# Patient Record
Sex: MALE | Race: WHITE | URBAN - METROPOLITAN AREA
[De-identification: names, ages, dates, MRNs, and addresses within clinical notes are randomized per-mention and may not be internally consistent; named-entity substitution may affect disease eponyms.]

---

## 2017-11-06 PROBLEM — I25.10 CORONARY ARTERY DISEASE INVOLVING NATIVE HEART WITHOUT ANGINA PECTORIS, UNSPECIFIED VESSEL OR LESION TYPE: Status: ACTIVE | Noted: 2017-11-06

## 2019-06-07 PROBLEM — E78.89 LIPIDS ABNORMAL: Status: ACTIVE | Noted: 2019-06-07

## 2019-06-07 PROBLEM — I10 ESSENTIAL HYPERTENSION: Status: ACTIVE | Noted: 2019-06-07

## 2023-08-09 RX ORDER — CARVEDILOL 12.5 MG/1
12.5 TABLET ORAL 2 TIMES DAILY WITH MEALS
COMMUNITY

## 2023-08-09 RX ORDER — AMLODIPINE BESYLATE 10 MG/1
10 TABLET ORAL DAILY
COMMUNITY

## 2023-08-11 RX ORDER — DULAGLUTIDE 0.75 MG/.5ML
INJECTION, SOLUTION SUBCUTANEOUS WEEKLY
COMMUNITY

## 2023-08-11 RX ORDER — PRAVASTATIN SODIUM 40 MG
40 TABLET ORAL NIGHTLY
COMMUNITY

## 2023-08-11 NOTE — PAT NURSING NOTE
TOA 2:00 pm, register at [facility] registration desk Andrea huang of [facility], park in [address] [place] [address]. Will call day before to confirm TOA. NPO at midnight, sips of water in am with aspirin, brilinta, pravastatin, carvedilol and amlodipine. No gum candy food or drink after midnight. Last dose of all over the counter vitamins,supplements and nsaids on 8/9, no insulin or diabetic medications am of surgery. L/D metformin, farxiga and lisinopril 8/15 am, remove continuous glucose monitor prior to surgery. Hold trulicity am of surgery. Remove jewelry etc. Shower with antibacterial soap such as dial. PATs on admit ok per [facility] office. Continue aspirin, plavix and statin. All questions answered and patient instructed to call PAT with any further questions.     GUNNER Snell RN

## 2023-08-16 ENCOUNTER — ANESTHESIA (OUTPATIENT)
Dept: CARDIAC SURGERY | Facility: HOSPITAL | Age: 68
End: 2023-08-16
Payer: MEDICARE

## 2023-08-16 ENCOUNTER — HOSPITAL ENCOUNTER (INPATIENT)
Facility: HOSPITAL | Age: 68
LOS: 2 days | Discharge: HOME | End: 2023-08-18
Attending: SURGERY | Admitting: SURGERY
Payer: MEDICARE

## 2023-08-16 ENCOUNTER — ANESTHESIA EVENT (OUTPATIENT)
Dept: CARDIAC SURGERY | Facility: HOSPITAL | Age: 68
End: 2023-08-16
Payer: MEDICARE

## 2023-08-16 DIAGNOSIS — Z91.89 AT RISK FOR BLEEDING: ICD-10-CM

## 2023-08-16 DIAGNOSIS — I65.22 CAROTID STENOSIS, LEFT: ICD-10-CM

## 2023-08-16 DIAGNOSIS — Z01.818 PRE-OP TESTING: Primary | ICD-10-CM

## 2023-08-16 LAB
ALBUMIN SERPL-MCNC: 3.9 G/DL (ref 3.4–5)
ALBUMIN/GLOB SERPL: 1 {RATIO} (ref 1–2)
ALP LIVER SERPL-CCNC: 75 U/L
ALT SERPL-CCNC: 35 U/L
ANION GAP SERPL CALC-SCNC: 7 MMOL/L (ref 0–18)
ANTIBODY SCREEN: NEGATIVE
APTT PPP: 29 SECONDS (ref 23.3–35.6)
AST SERPL-CCNC: 33 U/L (ref 15–37)
ATRIAL RATE: 61 BPM
BASOPHILS # BLD AUTO: 0.05 X10(3) UL (ref 0–0.2)
BASOPHILS NFR BLD AUTO: 0.6 %
BILIRUB SERPL-MCNC: 1 MG/DL (ref 0.1–2)
BUN BLD-MCNC: 28 MG/DL (ref 7–18)
CALCIUM BLD-MCNC: 9.1 MG/DL (ref 8.5–10.1)
CHLORIDE SERPL-SCNC: 107 MMOL/L (ref 98–112)
CO2 SERPL-SCNC: 24 MMOL/L (ref 21–32)
CREAT BLD-MCNC: 1.46 MG/DL
EGFRCR SERPLBLD CKD-EPI 2021: 52 ML/MIN/1.73M2 (ref 60–?)
EOSINOPHIL # BLD AUTO: 0.17 X10(3) UL (ref 0–0.7)
EOSINOPHIL NFR BLD AUTO: 2.1 %
ERYTHROCYTE [DISTWIDTH] IN BLOOD BY AUTOMATED COUNT: 12.3 %
GLOBULIN PLAS-MCNC: 3.8 G/DL (ref 2.8–4.4)
GLUCOSE BLD-MCNC: 111 MG/DL (ref 70–99)
GLUCOSE BLD-MCNC: 152 MG/DL (ref 70–99)
GLUCOSE BLD-MCNC: 154 MG/DL (ref 70–99)
GLUCOSE BLD-MCNC: 161 MG/DL (ref 70–99)
HCT VFR BLD AUTO: 42.5 %
HGB BLD-MCNC: 14.8 G/DL
IMM GRANULOCYTES # BLD AUTO: 0.04 X10(3) UL (ref 0–1)
IMM GRANULOCYTES NFR BLD: 0.5 %
INR BLD: 1 (ref 0.85–1.16)
LYMPHOCYTES # BLD AUTO: 2.48 X10(3) UL (ref 1–4)
LYMPHOCYTES NFR BLD AUTO: 30.3 %
MCH RBC QN AUTO: 30.4 PG (ref 26–34)
MCHC RBC AUTO-ENTMCNC: 34.8 G/DL (ref 31–37)
MCV RBC AUTO: 87.3 FL
MONOCYTES # BLD AUTO: 0.63 X10(3) UL (ref 0.1–1)
MONOCYTES NFR BLD AUTO: 7.7 %
MRSA DNA SPEC QL NAA+PROBE: NEGATIVE
NEUTROPHILS # BLD AUTO: 4.81 X10 (3) UL (ref 1.5–7.7)
NEUTROPHILS # BLD AUTO: 4.81 X10(3) UL (ref 1.5–7.7)
NEUTROPHILS NFR BLD AUTO: 58.8 %
OSMOLALITY SERPL CALC.SUM OF ELEC: 295 MOSM/KG (ref 275–295)
P AXIS: 10 DEGREES
P-R INTERVAL: 186 MS
PLATELET # BLD AUTO: 252 10(3)UL (ref 150–450)
POTASSIUM SERPL-SCNC: 4.5 MMOL/L (ref 3.5–5.1)
PROT SERPL-MCNC: 7.7 G/DL (ref 6.4–8.2)
PROTHROMBIN TIME: 13.2 SECONDS (ref 11.6–14.8)
Q-T INTERVAL: 398 MS
QRS DURATION: 130 MS
QTC CALCULATION (BEZET): 400 MS
R AXIS: -8 DEGREES
RBC # BLD AUTO: 4.87 X10(6)UL
RH BLOOD TYPE: POSITIVE
SARS-COV-2 RNA RESP QL NAA+PROBE: NOT DETECTED
SODIUM SERPL-SCNC: 138 MMOL/L (ref 136–145)
T AXIS: 2 DEGREES
VENTRICULAR RATE: 61 BPM
WBC # BLD AUTO: 8.2 X10(3) UL (ref 4–11)

## 2023-08-16 PROCEDURE — 85610 PROTHROMBIN TIME: CPT | Performed by: SURGERY

## 2023-08-16 PROCEDURE — 85730 THROMBOPLASTIN TIME PARTIAL: CPT | Performed by: SURGERY

## 2023-08-16 PROCEDURE — 85347 COAGULATION TIME ACTIVATED: CPT

## 2023-08-16 PROCEDURE — 80053 COMPREHEN METABOLIC PANEL: CPT | Performed by: SURGERY

## 2023-08-16 PROCEDURE — 86850 RBC ANTIBODY SCREEN: CPT | Performed by: SURGERY

## 2023-08-16 PROCEDURE — 86901 BLOOD TYPING SEROLOGIC RH(D): CPT | Performed by: SURGERY

## 2023-08-16 PROCEDURE — 85025 COMPLETE CBC W/AUTO DIFF WBC: CPT | Performed by: SURGERY

## 2023-08-16 PROCEDURE — 93005 ELECTROCARDIOGRAM TRACING: CPT

## 2023-08-16 PROCEDURE — 037L3DZ DILATION OF LEFT INTERNAL CAROTID ARTERY WITH INTRALUMINAL DEVICE, PERCUTANEOUS APPROACH: ICD-10-PCS | Performed by: SURGERY

## 2023-08-16 PROCEDURE — X2AJ336 CEREBRAL EMBOLIC FILTRATION, EXTRACORPOREAL FLOW REVERSAL CIRCUIT FROM LEFT COMMON CAROTID ARTERY, PERCUTANEOUS APPROACH, NEW TECHNOLOGY GROUP 6: ICD-10-PCS | Performed by: SURGERY

## 2023-08-16 PROCEDURE — 86900 BLOOD TYPING SEROLOGIC ABO: CPT | Performed by: SURGERY

## 2023-08-16 PROCEDURE — 76942 ECHO GUIDE FOR BIOPSY: CPT | Performed by: INTERNAL MEDICINE

## 2023-08-16 PROCEDURE — 86920 COMPATIBILITY TEST SPIN: CPT

## 2023-08-16 PROCEDURE — 82962 GLUCOSE BLOOD TEST: CPT

## 2023-08-16 PROCEDURE — 93010 ELECTROCARDIOGRAM REPORT: CPT | Performed by: INTERNAL MEDICINE

## 2023-08-16 PROCEDURE — 87641 MR-STAPH DNA AMP PROBE: CPT | Performed by: SURGERY

## 2023-08-16 DEVICE — 9 MM X 40 MM
Type: IMPLANTABLE DEVICE | Status: FUNCTIONAL
Brand: ENROUTE TRANSCAROTID STENT

## 2023-08-16 RX ORDER — SODIUM CHLORIDE, SODIUM LACTATE, POTASSIUM CHLORIDE, CALCIUM CHLORIDE 600; 310; 30; 20 MG/100ML; MG/100ML; MG/100ML; MG/100ML
INJECTION, SOLUTION INTRAVENOUS CONTINUOUS PRN
Status: DISCONTINUED | OUTPATIENT
Start: 2023-08-16 | End: 2023-08-16 | Stop reason: SURG

## 2023-08-16 RX ORDER — HYDROMORPHONE HYDROCHLORIDE 1 MG/ML
0.6 INJECTION, SOLUTION INTRAMUSCULAR; INTRAVENOUS; SUBCUTANEOUS EVERY 5 MIN PRN
Status: ACTIVE | OUTPATIENT
Start: 2023-08-16 | End: 2023-08-17

## 2023-08-16 RX ORDER — ACETAMINOPHEN 325 MG/1
650 TABLET ORAL EVERY 4 HOURS PRN
Status: DISCONTINUED | OUTPATIENT
Start: 2023-08-16 | End: 2023-08-18

## 2023-08-16 RX ORDER — HYDROMORPHONE HYDROCHLORIDE 1 MG/ML
0.2 INJECTION, SOLUTION INTRAMUSCULAR; INTRAVENOUS; SUBCUTANEOUS EVERY 2 HOUR PRN
Status: DISCONTINUED | OUTPATIENT
Start: 2023-08-16 | End: 2023-08-18

## 2023-08-16 RX ORDER — HEPARIN SODIUM 5000 [USP'U]/ML
5000 INJECTION, SOLUTION INTRAVENOUS; SUBCUTANEOUS ONCE
Status: DISCONTINUED | OUTPATIENT
Start: 2023-08-16 | End: 2023-08-16

## 2023-08-16 RX ORDER — DEXTROSE MONOHYDRATE 25 G/50ML
50 INJECTION, SOLUTION INTRAVENOUS
Status: DISCONTINUED | OUTPATIENT
Start: 2023-08-16 | End: 2023-08-18

## 2023-08-16 RX ORDER — HEPARIN SODIUM 5000 [USP'U]/ML
5000 INJECTION, SOLUTION INTRAVENOUS; SUBCUTANEOUS EVERY 8 HOURS SCHEDULED
Status: DISCONTINUED | OUTPATIENT
Start: 2023-08-17 | End: 2023-08-18

## 2023-08-16 RX ORDER — METOCLOPRAMIDE HYDROCHLORIDE 5 MG/ML
10 INJECTION INTRAMUSCULAR; INTRAVENOUS EVERY 8 HOURS PRN
Status: DISCONTINUED | OUTPATIENT
Start: 2023-08-16 | End: 2023-08-16

## 2023-08-16 RX ORDER — HYDROCODONE BITARTRATE AND ACETAMINOPHEN 5; 325 MG/1; MG/1
1 TABLET ORAL EVERY 4 HOURS PRN
Status: DISCONTINUED | OUTPATIENT
Start: 2023-08-16 | End: 2023-08-18

## 2023-08-16 RX ORDER — MIDAZOLAM HYDROCHLORIDE 1 MG/ML
INJECTION INTRAMUSCULAR; INTRAVENOUS AS NEEDED
Status: DISCONTINUED | OUTPATIENT
Start: 2023-08-16 | End: 2023-08-16 | Stop reason: SURG

## 2023-08-16 RX ORDER — LIDOCAINE HYDROCHLORIDE 10 MG/ML
INJECTION, SOLUTION INFILTRATION; PERINEURAL AS NEEDED
Status: DISCONTINUED | OUTPATIENT
Start: 2023-08-16 | End: 2023-08-16 | Stop reason: HOSPADM

## 2023-08-16 RX ORDER — LISINOPRIL 40 MG/1
40 TABLET ORAL DAILY
Status: DISCONTINUED | OUTPATIENT
Start: 2023-08-16 | End: 2023-08-18

## 2023-08-16 RX ORDER — NICOTINE POLACRILEX 4 MG
30 LOZENGE BUCCAL
Status: DISCONTINUED | OUTPATIENT
Start: 2023-08-16 | End: 2023-08-18

## 2023-08-16 RX ORDER — SODIUM CHLORIDE, SODIUM LACTATE, POTASSIUM CHLORIDE, CALCIUM CHLORIDE 600; 310; 30; 20 MG/100ML; MG/100ML; MG/100ML; MG/100ML
INJECTION, SOLUTION INTRAVENOUS CONTINUOUS
Status: DISCONTINUED | OUTPATIENT
Start: 2023-08-16 | End: 2023-08-18

## 2023-08-16 RX ORDER — GLYCOPYRROLATE 0.2 MG/ML
INJECTION, SOLUTION INTRAMUSCULAR; INTRAVENOUS AS NEEDED
Status: DISCONTINUED | OUTPATIENT
Start: 2023-08-16 | End: 2023-08-16 | Stop reason: SURG

## 2023-08-16 RX ORDER — HYDROMORPHONE HYDROCHLORIDE 1 MG/ML
0.2 INJECTION, SOLUTION INTRAMUSCULAR; INTRAVENOUS; SUBCUTANEOUS EVERY 5 MIN PRN
Status: ACTIVE | OUTPATIENT
Start: 2023-08-16 | End: 2023-08-17

## 2023-08-16 RX ORDER — IODIXANOL 320 MG/ML
100 INJECTION, SOLUTION INTRAVASCULAR
Status: DISCONTINUED | OUTPATIENT
Start: 2023-08-16 | End: 2023-08-18

## 2023-08-16 RX ORDER — HYDROCODONE BITARTRATE AND ACETAMINOPHEN 5; 325 MG/1; MG/1
2 TABLET ORAL EVERY 4 HOURS PRN
Status: DISCONTINUED | OUTPATIENT
Start: 2023-08-16 | End: 2023-08-18

## 2023-08-16 RX ORDER — ASPIRIN 81 MG/1
81 TABLET, CHEWABLE ORAL DAILY
Status: DISCONTINUED | OUTPATIENT
Start: 2023-08-16 | End: 2023-08-18

## 2023-08-16 RX ORDER — LOPERAMIDE HYDROCHLORIDE 2 MG/1
2 CAPSULE ORAL 4 TIMES DAILY PRN
Status: DISCONTINUED | OUTPATIENT
Start: 2023-08-16 | End: 2023-08-18

## 2023-08-16 RX ORDER — HYDROMORPHONE HYDROCHLORIDE 1 MG/ML
0.4 INJECTION, SOLUTION INTRAMUSCULAR; INTRAVENOUS; SUBCUTANEOUS EVERY 5 MIN PRN
Status: ACTIVE | OUTPATIENT
Start: 2023-08-16 | End: 2023-08-17

## 2023-08-16 RX ORDER — ONDANSETRON 2 MG/ML
4 INJECTION INTRAMUSCULAR; INTRAVENOUS EVERY 6 HOURS PRN
Status: DISCONTINUED | OUTPATIENT
Start: 2023-08-16 | End: 2023-08-18

## 2023-08-16 RX ORDER — PRAVASTATIN SODIUM 20 MG
40 TABLET ORAL NIGHTLY
Status: DISCONTINUED | OUTPATIENT
Start: 2023-08-16 | End: 2023-08-18

## 2023-08-16 RX ORDER — ONDANSETRON 2 MG/ML
4 INJECTION INTRAMUSCULAR; INTRAVENOUS EVERY 6 HOURS PRN
Status: DISCONTINUED | OUTPATIENT
Start: 2023-08-16 | End: 2023-08-16

## 2023-08-16 RX ORDER — HYDROMORPHONE HYDROCHLORIDE 1 MG/ML
0.4 INJECTION, SOLUTION INTRAMUSCULAR; INTRAVENOUS; SUBCUTANEOUS EVERY 2 HOUR PRN
Status: DISCONTINUED | OUTPATIENT
Start: 2023-08-16 | End: 2023-08-18

## 2023-08-16 RX ORDER — HEPARIN SODIUM 1000 [USP'U]/ML
INJECTION, SOLUTION INTRAVENOUS; SUBCUTANEOUS AS NEEDED
Status: DISCONTINUED | OUTPATIENT
Start: 2023-08-16 | End: 2023-08-16 | Stop reason: SURG

## 2023-08-16 RX ORDER — CEFAZOLIN SODIUM/WATER 2 G/20 ML
SYRINGE (ML) INTRAVENOUS AS NEEDED
Status: DISCONTINUED | OUTPATIENT
Start: 2023-08-16 | End: 2023-08-16 | Stop reason: SURG

## 2023-08-16 RX ORDER — METOCLOPRAMIDE HYDROCHLORIDE 5 MG/ML
10 INJECTION INTRAMUSCULAR; INTRAVENOUS EVERY 8 HOURS PRN
Status: DISCONTINUED | OUTPATIENT
Start: 2023-08-16 | End: 2023-08-18

## 2023-08-16 RX ORDER — AMLODIPINE BESYLATE 5 MG/1
10 TABLET ORAL DAILY
Status: DISCONTINUED | OUTPATIENT
Start: 2023-08-16 | End: 2023-08-18

## 2023-08-16 RX ORDER — CEFAZOLIN SODIUM/WATER 2 G/20 ML
2 SYRINGE (ML) INTRAVENOUS EVERY 8 HOURS
Status: COMPLETED | OUTPATIENT
Start: 2023-08-17 | End: 2023-08-17

## 2023-08-16 RX ORDER — NALOXONE HYDROCHLORIDE 0.4 MG/ML
80 INJECTION, SOLUTION INTRAMUSCULAR; INTRAVENOUS; SUBCUTANEOUS AS NEEDED
Status: ACTIVE | OUTPATIENT
Start: 2023-08-16 | End: 2023-08-17

## 2023-08-16 RX ORDER — NICOTINE POLACRILEX 4 MG
15 LOZENGE BUCCAL
Status: DISCONTINUED | OUTPATIENT
Start: 2023-08-16 | End: 2023-08-18

## 2023-08-16 RX ORDER — BUPIVACAINE HYDROCHLORIDE 5 MG/ML
INJECTION, SOLUTION EPIDURAL; INTRACAUDAL AS NEEDED
Status: DISCONTINUED | OUTPATIENT
Start: 2023-08-16 | End: 2023-08-16 | Stop reason: HOSPADM

## 2023-08-16 RX ORDER — CARVEDILOL 12.5 MG/1
12.5 TABLET ORAL 2 TIMES DAILY WITH MEALS
Status: DISCONTINUED | OUTPATIENT
Start: 2023-08-16 | End: 2023-08-18

## 2023-08-16 RX ORDER — HYDROMORPHONE HYDROCHLORIDE 1 MG/ML
0.8 INJECTION, SOLUTION INTRAMUSCULAR; INTRAVENOUS; SUBCUTANEOUS EVERY 2 HOUR PRN
Status: DISCONTINUED | OUTPATIENT
Start: 2023-08-16 | End: 2023-08-18

## 2023-08-16 RX ORDER — DEXMEDETOMIDINE HYDROCHLORIDE 4 UG/ML
INJECTION, SOLUTION INTRAVENOUS CONTINUOUS PRN
Status: DISCONTINUED | OUTPATIENT
Start: 2023-08-16 | End: 2023-08-16 | Stop reason: SURG

## 2023-08-16 RX ORDER — GABAPENTIN 300 MG/1
900 CAPSULE ORAL 3 TIMES DAILY
Status: DISCONTINUED | OUTPATIENT
Start: 2023-08-16 | End: 2023-08-18

## 2023-08-16 RX ORDER — PROTAMINE SULFATE 10 MG/ML
INJECTION, SOLUTION INTRAVENOUS AS NEEDED
Status: DISCONTINUED | OUTPATIENT
Start: 2023-08-16 | End: 2023-08-16 | Stop reason: SURG

## 2023-08-16 RX ADMIN — GLYCOPYRROLATE 0.2 MG: 0.2 INJECTION, SOLUTION INTRAMUSCULAR; INTRAVENOUS at 17:22:00

## 2023-08-16 RX ADMIN — CEFAZOLIN SODIUM/WATER 2 G: 2 G/20 ML SYRINGE (ML) INTRAVENOUS at 17:01:00

## 2023-08-16 RX ADMIN — DEXMEDETOMIDINE HYDROCHLORIDE 0.8 MCG/KG/HR: 4 INJECTION, SOLUTION INTRAVENOUS at 16:42:00

## 2023-08-16 RX ADMIN — MIDAZOLAM HYDROCHLORIDE 4 MG: 1 INJECTION INTRAMUSCULAR; INTRAVENOUS at 17:02:00

## 2023-08-16 RX ADMIN — HEPARIN SODIUM 13000 UNITS: 1000 INJECTION, SOLUTION INTRAVENOUS; SUBCUTANEOUS at 17:17:00

## 2023-08-16 RX ADMIN — SODIUM CHLORIDE, SODIUM LACTATE, POTASSIUM CHLORIDE, CALCIUM CHLORIDE: 600; 310; 30; 20 INJECTION, SOLUTION INTRAVENOUS at 16:39:00

## 2023-08-16 RX ADMIN — GLYCOPYRROLATE 0.2 MG: 0.2 INJECTION, SOLUTION INTRAMUSCULAR; INTRAVENOUS at 17:13:00

## 2023-08-16 RX ADMIN — PROTAMINE SULFATE 40 MG: 10 INJECTION, SOLUTION INTRAVENOUS at 17:41:00

## 2023-08-16 NOTE — OPERATIVE REPORT
Vascular Surgery Operative Report  Kate Ovalles  [id number]  3/3/1955          Date of Procedure: 08/16/2023        PREOPERATIVE DIAGNOSIS:  1. Asymptomatic left ICA stenosis         POSTOPERATIVE DIAGNOSIS:  1. Asymptomatic Left ICA stenosis         OPERATION PERFORMED:     1. Ultrasound guided percutaneous access of left common femoral vein       2. Angioplasty/stenting of the left ICA [using TransCarotid Artery Revascularization (TCAR) technique] with [address] 9 x 40 mm self expanding stent           ANESTHESIA:  MAC, Local        SURGEONS:   Cordell Valentine MD     ASSISTANT: Christian Moore SA        ESTIMATED BLOOD LOSS:  20 mL. DRAINS:  None. TRANSFUSIONS:  None. COMPLICATIONS:  None apparent. FLUOROSCOPY TIME: 4.6 min     FLOW REVERSAL TIME: 5 minutes     AORTIC ARCH TYPE: 1A     Procedure time: 45 min. INDICATIONS FOR PROCEDURE:  This is a 80year old male who presented with high grade carotid stenosis on the left. The patient underwent a CT angiogram of the head & neck that revealed a high grade stenoses with a high lesion at level of C1/C2. The patient was on aspirin and Plavix and referred for surgery. Cardiac workup was negative. Given on evaluation he was found to have an anatomically difficult lesion to repair with conventional carotid endarterectomy and previous neck surgery with her age and symptomatic nature, he was thus recommended for carotid artery angioplasty/stenting (MARIA M) was recommended to the patient with flow reversal for embolic protection (TCAR technique). The risks and benefits of the procedure were  discussed at length with the patient, not limited to the risk of bleeding, infection, stroke, myocardial infarction, and/or death. Informed consent was directly obtained from the patient. FINDINGS:  1. The left common femoral venous access flushed and aspirated well without issues.   2. The left ICA with 90 % stenosis and located at C1-C2 vertebral level.  3. Successful left sided TCAR. Post intervention, there was no residual stenosis. 3. Patient was neurologically intact at end of procedure. DESCRIPTION OF PROCEDURE: The patient was  brought to the operating room and placed in supine position. Arterial line was placed in sterile  fashion without any complication. Anesthesia was initiated without any issues. Ancef was administered for surgical  antimicrobial prophylaxis. The patient was subsequently prepped and draped in  the usual sterile fashion. Timeout was performed. A 4 cm transverse incision  was made over the inferior neck along the space between the sternal and  clavicular heads of the sternocleidomastoid muscle. Dissection was carried  down with electrocautery. The common carotid artery was identified and circumferentially dissected. This was then encircled with an  umbilical tape. Approximately 3 cm of common carotid was dissected proximally  and distally. 5-0 Prolene stay sutures were placed at the base of the common carotid to serve as our repair stitches  at the end of the procedure. In the groin, with the use of ultrasound guidance, the common femoral vein  was accessed with a micro needle with advancement of a microwire and exchanged for a micro sheath. J wire was put in place. This was  predilated with a 5Fr sheath and then exchanged for the 8-Romanian venous sheath that was  advanced over the wire and drawn back, confirming successful cannulation, and  flushed with heparinized saline. The patient was systemically heparinized with activated clotting time of above 300 seconds. The systolic blood pressure was iatrogenically elevated to 160-180s mmHg. A micropuncture needle was used to  access the common carotid artery, and a 0.014 wire was advanced over this.   We  then replaced this with the 4-Romanian dilating sheath, and the wire was  withdrawn, and a head and neck arteriogram was performed to confirm the  location of our lesion. We then exchanged for the [employer] .035 wire and then exchanged for a the [employer] medical?8Fr Sheath. Once the sheath was in place, this was flushed  with heparinized saline and sutured to the skin with a silk suture. Once this was performed,  the sheaths were connected by the flow reversal system and flushed thereby initiating passive flow reversal.  After confirming adequate ACTs and BP levels, we were ready to proceed. A repeat head and neck angiogram was performed to determine our landing zone. A rommell tourniquet was cinched down thereby initiating full flow reversal.     We advanced a silk road 0.014\" wire which were used to traverse the lesion with wire placement into the ICA. Predilation was performed with 5 mm balloon. We then exchanged for a 9 x  40 mm [employer] self-expanding bare metal stent  which was brought up to the culprit lesion and deployed without any  difficulty. A repeat head and neck angiogram revealed wide patency. The system was flushed for an additional minute and then the rommell was removed thereby restablishing antegrade flow. The sheaths were disconnected thereby terminating the flow reversal and the blood was allowed to return into the venous  cannula. At this point, we were satisfied with  our repair and opted to proceed with closure. The sheath was subsequently  removed with cinching down of the pre-close sutures, which were eventually  cinched down and tied, thereby closing the arteriotomy site in its entirety. Once they were tied, the sutures were cut, and Doppler was used to confirm  excellent flow distally from our access site. Surgicel was placed into the  wound, and manual pressure was held for approximately 15 minutes. 40 mg Protamine  was  administered for reversal of heparinization. The wound was then inspected and found  to be hemostatic.   FloSeal was placed into the wound bed and manual  pressure was held for another 5 minutes to confirm excellent hemostasis. At  this point, we were satisfied and opted to proceed with closure. In the groin, the   venous  sheath was removed with manual pressure held. The sternocleidomastoid muscle was approximated with 3-0 Vicryl suture in  interrupted fashion. The subcutaneous tissues were reapproximated with 3-0  Vicryl suture in interrupted fashion. The skin was closed with 4-0 Monocryl  suture in subcuticular fashion, and Dermabond was applied over the incision. The patient awoke from conscious sedation able to follow commands and move all  4 extremities without any difficulties and was taken to the surgical intensive  care unit in stable condition. All counts were correct at the end of the  case. I performed all portions of the procedure. DISPOSITION:  Awoke from conscious sedation and was taken to the surgical  intensive care unit in stable condition. The patient will undergo q.1 hour neuro  checks with goal blood pressure between 100 and 140 mmHg. He will be continuing Plavix 75 mg daily as well as 81mg ASA.      MD Nani Papp[place] [address] [facility]  Vascular Surgery

## 2023-08-16 NOTE — ANESTHESIA PROCEDURE NOTES
Arterial Line    Date/Time: 8/16/2023 5:26 PM    Performed by: Juliana Karimi MD  Authorized by: Juliana Karimi MD    General Information and Staff    Procedure Start:  8/16/2023 5:26 PM  Procedure End:  8/16/2023 5:26 PM  Anesthesiologist:  Juliana Karimi MD  Performed By:  Anesthesiologist  Patient Location:  OR  Indication: continuous blood pressure monitoring and blood sampling needed    Site Identification: real time ultrasound guided, surface landmarks and image stored and retrievable    Preanesthetic Checklist: 2 patient identifiers, IV checked, risks and benefits discussed, monitors and equipment checked, pre-op evaluation, timeout performed, anesthesia consent and sterile technique used    Procedure Details    Catheter Size:  20 G  Catheter Length:  1 and 3/4 inch  Catheter Type:  Arrow  Seldinger Technique?: Yes    Laterality:  Left  Site:  Radial artery  Site Prep: chlorhexidine    Line Secured:  Wrist Brace, tape and Tegaderm    Assessment    Events: patient tolerated procedure well with no complications      Medications  8/16/2023 5:26 PM      Additional Comments

## 2023-08-16 NOTE — ANESTHESIA POSTPROCEDURE EVALUATION
Ramón Hallman 56 Patient Status:  Inpatient   Age/Gender 80year old male MRN [id number]   [facility] 6NE-A Attending Eleonora Rodriguez MD   [facility] Day # 0 PCP Alissa Raphael       Anesthesia Post-op Note    LEFT TRANSCAROTID ARTERY REVASCULARIZATION USING 3X17C02 STENT. SEE CATH LAB NOTES. Procedure Summary       Date: 08/16/23 Room / Location: [address] [place] [address]    Anesthesia Start: 4685 Anesthesia Stop: 8481    Procedure: LEFT TRANSCAROTID ARTERY REVASCULARIZATION USING 9A40F53 UNEID. SEE CATH LAB NOTES. (Left) Diagnosis: (CAROTID STENOSIS LEFT)    Surgeons: Eleonora Rodriguez MD Anesthesiologist: Pantera Mendiola MD    Anesthesia Type: MAC ASA Status: 3            Anesthesia Type: MAC    Vitals Value Taken Time   /72    Temp 97.8 08/16/23 1814   Pulse 76 08/16/23 1814   Resp 13 08/16/23 1814   SpO2 93 % 08/16/23 1814   Vitals shown include unvalidated device data. Patient Location: ICU    Anesthesia Type: MAC    Airway Patency: patent    Postop Pain Control: adequate    Mental Status: preanesthetic baseline    Nausea/Vomiting: none    Cardiopulmonary/Hydration status: stable euvolemic    Complications: no apparent anesthesia related complications    Postop vital signs: stable    Dental Exam: Unchanged from Preop    Sign out given to ICU staff.

## 2023-08-16 NOTE — H&P
The above referenced H&P was reviewed by Cordell Valentine MD on 8/16/2023, the patient was examined and no significant changes have occurred in the patient's condition since the H&P was performed. Risks and benefits were discussed, proceed with procedure as planned.       Cordell Valentine MD  [address]  Vascular Surgery

## 2023-08-17 LAB
ANION GAP SERPL CALC-SCNC: 4 MMOL/L (ref 0–18)
APTT PPP: 26.3 SECONDS (ref 23.3–35.6)
BUN BLD-MCNC: 21 MG/DL (ref 7–18)
CALCIUM BLD-MCNC: 8.7 MG/DL (ref 8.5–10.1)
CHLORIDE SERPL-SCNC: 108 MMOL/L (ref 98–112)
CO2 SERPL-SCNC: 24 MMOL/L (ref 21–32)
CREAT BLD-MCNC: 1.1 MG/DL
EGFRCR SERPLBLD CKD-EPI 2021: 74 ML/MIN/1.73M2 (ref 60–?)
ERYTHROCYTE [DISTWIDTH] IN BLOOD BY AUTOMATED COUNT: 12.2 %
GLUCOSE BLD-MCNC: 110 MG/DL (ref 70–99)
GLUCOSE BLD-MCNC: 127 MG/DL (ref 70–99)
GLUCOSE BLD-MCNC: 148 MG/DL (ref 70–99)
GLUCOSE BLD-MCNC: 169 MG/DL (ref 70–99)
GLUCOSE BLD-MCNC: 188 MG/DL (ref 70–99)
HCT VFR BLD AUTO: 39.5 %
HGB BLD-MCNC: 13.8 G/DL
INR BLD: 1.09 (ref 0.85–1.16)
ISTAT ACTIVATED CLOTTING TIME: 335 SECONDS (ref 74–137)
MCH RBC QN AUTO: 30.2 PG (ref 26–34)
MCHC RBC AUTO-ENTMCNC: 34.9 G/DL (ref 31–37)
MCV RBC AUTO: 86.4 FL
OSMOLALITY SERPL CALC.SUM OF ELEC: 287 MOSM/KG (ref 275–295)
PLATELET # BLD AUTO: 222 10(3)UL (ref 150–450)
POTASSIUM SERPL-SCNC: 4.4 MMOL/L (ref 3.5–5.1)
PROTHROMBIN TIME: 14.1 SECONDS (ref 11.6–14.8)
RBC # BLD AUTO: 4.57 X10(6)UL
SODIUM SERPL-SCNC: 136 MMOL/L (ref 136–145)
WBC # BLD AUTO: 11 X10(3) UL (ref 4–11)

## 2023-08-17 PROCEDURE — 97116 GAIT TRAINING THERAPY: CPT

## 2023-08-17 PROCEDURE — 80048 BASIC METABOLIC PNL TOTAL CA: CPT | Performed by: SURGERY

## 2023-08-17 PROCEDURE — 85027 COMPLETE CBC AUTOMATED: CPT | Performed by: SURGERY

## 2023-08-17 PROCEDURE — 97161 PT EVAL LOW COMPLEX 20 MIN: CPT

## 2023-08-17 PROCEDURE — 85610 PROTHROMBIN TIME: CPT | Performed by: SURGERY

## 2023-08-17 PROCEDURE — 82962 GLUCOSE BLOOD TEST: CPT

## 2023-08-17 PROCEDURE — 85730 THROMBOPLASTIN TIME PARTIAL: CPT | Performed by: SURGERY

## 2023-08-17 NOTE — DISCHARGE INSTRUCTIONS
You may shower with soap and water starting on 08/18/2023 . No soaking in bath or pool for 2 weeks. You should resume all home medications as previously. Take tylenol as needed for pain. You will need to take aspirin and plavix daily. Please measure your blood pressure three times daily. If you pressure is >180 or <80 sustained, please call the office. Any severe headaches not relieved by tylenol, please call the office. No heavy lifting or straining for 2 weeks. You may then resume to normal activity. Drink plenty of fluids to stay well hydrated. You will need to followup with Dr. Eleazar Valero in the vascular clinic in 2 weeks. Please call the office at [phone] to make an appt. If you have any fevers, chills, chest pain, shortness of breath, drainage, swelling or bleeding, please call the office in order to return to clinic sooner for evaluation.

## 2023-08-17 NOTE — PLAN OF CARE
Patient VSS. No SOB, pain in control with PRN pain meds. Surgical site intact with no sign of complications. Neuro assessment intact. Patient able to urinate with some difficulty, will continue to monitor. Patient up to bedside with no issues.

## 2023-08-17 NOTE — PROGRESS NOTES
08/17/23 1523   Clinical Encounter Type   Visited With Patient   Routine Visit Introduction   Taxonomy   Intended Effects Convey a calming presence   Methods Offer emotional support; Offer spiritual/Tenriism support   Interventions Acknowledge current situation; Acknowledge response to difficult experience; Active listening; Ask guided questions;[place]     Discussion:     [facility] [address] Pt Braden Ortizian) reported that he was in a lot of pain at the time of visit and requested prayer.  prayed with Rylan Situ. Rylan Situ appreciate the visit and the Tenriism care provided during visit. Javier Bansal will attempt to follow up with patient tomorrow. Spiritual Care support can be requested via an [facility] consult or ext. [phone].        Abram Baxter M.Div, Chaplain Resident  Ext. [phone]

## 2023-08-17 NOTE — PLAN OF CARE
Received pt at 0730. Pt alert and oriented x4, neurologically intact besides baseline neuropathy. Pt on RA, lungs clear/diminished. Pt in NSR, SBP stable. Pt neck incision is approximated, no hematoma or drainage noted. Pt walks in halls. Transfer orders placed. Pt arranging ride home because he thought he would be able to drive himself, but notified by Dr Hardeep Li that he can not. Pt updated on plan of care, will continue to monitor.

## 2023-08-17 NOTE — PHYSICAL THERAPY NOTE
20    Lela Dumont  :  1983      To Whom It May Concern: This patient was seen in our office on 20 .       Work Duty Status:   Work Related: Yes  MMI:No  Work Status: Modified Work with Restrictions: (Note: If these restrictions ar PHYSICAL THERAPY EVALUATION - INPATIENT     Room Number: 5204/4741-C  Evaluation Date: 2023  Type of Evaluation: Initial  Physician Order: PT Eval and Treat    Presenting Problem:  - L TEA  Co-Morbidities : HTN, CABG, CAD, DM, HL  Reason for Therapy: Mobility Dysfunction and Discharge Planning      ASSESSMENT   Pt is a 80year old male admitted on 2023 for L TEA. Functional outcome measures completed include [facility]. The AM-PAC '6-Clicks' Inpatient Basic Mobility Short Form was completed and this patient is demonstrating a Approx Degree of Impairment: 20.91%  degree of impairment in mobility. Research supports that patients with this level of impairment may benefit from home at d/c.  PT Discharge Recommendations: Home      PLAN  Patient has been evaluated and presents with no skilled Physical Therapy needs at this time. Patient discharged from Physical Therapy services. Please re-order if a new functional limitation presents during this admission. GOALS  Patient was able to achieve the following goals . .. Patient was able to transfer Safely and independently   Patient able to ambulate on level surfaces Safely and independently         [address] [place]: One level  Stairs to Enter : 1  Railing: No  Stairs to Bedroom: 0       Lives With: Alone  Drives: Yes  Patient Owned Equipment: Rolling walker;Cane  Patient Regularly Uses: Reading glasses    Prior Level of [place]: Pt is typically independent w/ adl's, gait w/o device, drives and is retired. Reports that his dtr lives 5 min away. Pt concerned about d/c today, would prefer tomorrow as he is driving himself home - lives about 2 hours away. Rn aware. SUBJECTIVE  \"I'm a little stiff, I just need to get going. \"      OBJECTIVE  Precautions: None  Fall Risk: Standard fall risk    WEIGHT BEARING RESTRICTION  Weight Bearing Restriction: None                PAIN ASSESSMENT  Ratin  Location: L neck/surgical site  Management Techniques: Activity promotion;Repositioning    COGNITION  Overall Cognitive Status:  WFL - within functional limits    RANGE OF MOTION AND STRENGTH ASSESSMENT  Upper extremity ROM and strength are within functional limits     Lower extremity ROM is within functional limits     Lower extremity strength is within functional limits 4+/5 throughout      BALANCE  Static Sitting: Good  Dynamic Sitting: Good  Static Standing: Fair  Dynamic Standing: Fair    ADDITIONAL TESTS                                    ACTIVITY TOLERANCE  Pulse: 71  Heart Rate Source: Monitor                   O2 WALK  Oxygen Therapy  SPO2% on Room Air at Rest: 100    NEUROLOGICAL FINDINGS  Neurological Findings: Coordination - Rapid Alternating Movement; Coordination - Heel to [employer] - Heel to Shin: Symmetrical  Coordination - Rapid Alternating Movement: Symmetrical            AM-PAC '6-Clicks' INPATIENT SHORT FORM - BASIC MOBILITY  How much difficulty does the patient currently have. .. Patient Difficulty: Turning over in bed (including adjusting bedclothes, sheets and blankets)?: None   Patient Difficulty: Sitting down on and standing up from a chair with arms (e.g., wheelchair, bedside commode, etc.): None   Patient Difficulty: Moving from lying on back to sitting on the side of the bed?: None   How much help from another person does the patient currently need. ..    Help from Another: Moving to and from a bed to a chair (including a wheelchair)?: None   Help from Another: Need to walk in hospital room?: A Little   Help from Another: Climbing 3-5 steps with a railing?: A Little       AM-PAC Score:  Raw Score: 22   Approx Degree of Impairment: 20.91%   Standardized Score (AM-PAC Scale): 53.28   CMS Modifier (G-Code): CJ    FUNCTIONAL ABILITY STATUS  Gait Assessment   Functional Mobility/Gait Assessment  Gait Assistance: Supervision  Distance (ft): 175  Assistive Device: None  Pattern: Within Functional Limits (Lateral sway)    Skilled Therapy Provided     Bed Mobility:  Rolling: Right and Left w/ hob flat - mod I  Supine to sit: Mod I w/ hob flat   Sit to supine: Mod I w/ hob flat     Transfer Mobility:  Sit to stand: Mod I   Stand to sit: Mod I  Gait = Initially pt's 1st few steps of gait he was CGA as he was reaching for support from wall, desk etc.  Pt reported he was feeling stiff (and I noted he was very distracted by his cords) and that is why he was reaching for support. As pt's gait progressed, his step length normalized, encouraged his arm swing and gait became much more stable. Pt w/ very high safety awareness and was cautious throughout session. Reminded pt that he could use a rw or cane at home if he feels his gait is less than normal.  Pt expressed understanding/agreement, but notes his gait feels much better/normal by end of session. Therapist's comments:Pt donned underwear and sweats independently and safely. Able to use bathroom and wash hands independently as well. Patient educated on pacing gait and activities following surgery and performing ankle pumps at regular intervals during the day. Pt expresses understanding. Exercise/Education Provided:  Bed mobility  Functional activity tolerated  Gait training  Transfer training    Patient End of Session: Up in chair;Needs met;Call light within reach;RN aware of session/findings; All patient questions and concerns addressed Jen Xavier aware of eval session)    Patient Evaluation Complexity Level:  History Moderate - 1 or 2 personal factors and/or co-morbidities   Examination of body systems Moderate - addressing a total of 3 or more elements   Clinical Presentation Low - Stable   Clinical Decision Making Low Complexity       PT Session Time: 35 minutes  Gait Trainin minutes  Therapeutic Activity: 10 minutes  Neuromuscular Re-education: 0 minutes  Therapeutic Exercise: 1 minutes

## 2023-08-17 NOTE — PLAN OF CARE
Recvd pt from [facility] 23, he is slightly drowsy but oriented x4, FARAH. Left neck incision site with skin glue is stable with no drainage. He has left arterial line. No c/o nausea, c/o pain rated 6/10 on pain scale, medication given as ordered. POC explained, all questions answered.

## 2023-08-18 VITALS
RESPIRATION RATE: 14 BRPM | HEIGHT: 70 IN | BODY MASS INDEX: 34.09 KG/M2 | WEIGHT: 238.13 LBS | DIASTOLIC BLOOD PRESSURE: 60 MMHG | HEART RATE: 72 BPM | OXYGEN SATURATION: 95 % | SYSTOLIC BLOOD PRESSURE: 99 MMHG | TEMPERATURE: 98 F

## 2023-08-18 LAB
BLOOD TYPE BARCODE: 5100
GLUCOSE BLD-MCNC: 185 MG/DL (ref 70–99)
GLUCOSE BLD-MCNC: 193 MG/DL (ref 70–99)
UNIT VOLUME: 350 ML

## 2023-08-18 PROCEDURE — 82962 GLUCOSE BLOOD TEST: CPT

## 2023-08-18 NOTE — CM/SW NOTE
Patient worked with therapy yesterday--recommendation of HOME.    to continue to follow for any further assistance with discharge planning

## 2023-08-18 NOTE — PLAN OF CARE
Resumed care at 0730. Aox4, neuro intact. Vitals stable. OOB in chair, ambulate in hallway/bathroom. Fingerstick before meals, coverage given as ordered. Tolerating diet. Neck incision clean, dry with no drainage, open to air with skin glue. C/o neck incisional pain at 3/10, declined pain medication. Discharged home today with daughter to give ride home. Discharge instructions given and explained to patient, verbalized understanding. Ivs removed. Discharged with all belongings, wheelchair to lobby.

## 2023-08-22 NOTE — DISCHARGE SUMMARY
[facility]  Discharge Summary    Laurie Long Patient Status:  Inpatient    3/3/1955 MRN [id number]   [facility] 6NE-A Attending No att. providers found   Hosp Day # 2 PCP Keisha Dietz     Date of Admission: 2023    Date of Discharge: 2023  2:45 PM    Admitting Diagnosis: CAROTID STENOSIS LEFT  Pre-op testing    Discharge Diagnosis: Patient Active Problem List:     Coronary artery disease involving native heart without angina pectoris, unspecified vessel or lesion type     Essential hypertension     Lipids abnormal     Pre-op testing      Reason for Admission: Carotid stenosis    Hospital Course: 81 yo M with hx of left ICA stenosis s/p left TCAR. Postop course unremarkable. Due to patient disposition, he stayed an additional day. On POD2, he was tolerating PO, ambulating and voiding with no issues. Consultations: None    Procedures: Left TCAR    Complications: None    Disposition: Home or Self Care    Discharge Condition: Good    Discharge Medications: Discharge Medication List as of 2023  1:55 PM    CONTINUE these medications which have NOT CHANGED    pravastatin 40 MG Oral Tab  Take 1 tablet (40 mg total) by mouth nightly., Historical    ticagrelor 90 MG Oral Tab  Take 1 tablet (90 mg total) by mouth every 12 (twelve) hours. , Historical    Dulaglutide (TRULICITY) 4.50 TF/7.2XT Subcutaneous Solution Pen-injector  Inject into the skin once a week. On , Historical    carvedilol 12.5 MG Oral Tab  Take 1 tablet (12.5 mg total) by mouth 2 (two) times daily with meals. , Historical    dapagliflozin (FARXIGA) 10 MG Oral Tab  Take 1 tablet (10 mg total) by mouth daily. , Historical    amLODIPine 10 MG Oral Tab  Take 1 tablet (10 mg total) by mouth daily. , Historical    lisinopril 40 MG Oral Tab  Take 1 tablet (40 mg total) by mouth daily. , Historical    Insulin Lispro, 1 Unit Dial, 100 UNIT/ML Subcutaneous Solution Pen-injector  Inject into the skin As Directed. Sliding scale, TID before meals PRN, Historical    metFORMIN HCl  MG Oral Tablet 24 Hr  Take 2 tablets (1,000 mg total) by mouth 2 (two) times daily with meals. , Historical    aspirin 325 MG Oral Tab  Take 1 tablet (325 mg total) by mouth daily. , Historical    insulin glargine 300 UNIT/ML Subcutaneous Solution Pen-injector  Inject into the skin 2 (two) times a day. TOUJEO. Takes 2-3 times daily, 30-35 units PRN, Historical    Loperamide HCl 2 MG Oral Cap  Take 1 capsule (2 mg total) by mouth 4 (four) times daily as needed for Diarrhea., Historical    gabapentin 300 MG Oral Cap  Take 3 capsules (900 mg total) by mouth 3 (three) times daily. , Historical    HYDROcodone-acetaminophen  MG Oral Tab  Take 1-2 tablets by mouth every 6 (six) hours as needed., Historical      STOP taking these medications    Meloxicam 15 MG Oral Tab    folic acid 1 MG Oral Tab          Follow up Visits: Follow-up with Dr Jerson Hlae in 2 weeks.       Carlos A Hook MD  8/21/2023  9:40 PM Pancreatic lesions

## (undated) DEVICE — SUT MONOCRYL 4-0 PS-2 Y496G

## (undated) DEVICE — DERMABOND CLOSURE 0.7ML TOPICL

## (undated) DEVICE — SUT VICRYL 3-0 SH J416H

## (undated) DEVICE — PTA BALLOON DILATATION CATHETER: Brand: STERLING™

## (undated) DEVICE — PROXIMATE RH ROTATING HEAD SKIN STAPLERS (35 WIDE) CONTAINS 35 STAINLESS STEEL STAPLES: Brand: PROXIMATE

## (undated) DEVICE — SOL NACL IRRIG 0.9% 1000ML BTL

## (undated) DEVICE — SUT SILK 2-0 A185H

## (undated) DEVICE — PINNACLE INTRODUCER SHEATH: Brand: PINNACLE

## (undated) DEVICE — ANGIO PACK-LF: Brand: MEDLINE INDUSTRIES, INC.

## (undated) DEVICE — KIT ANGIOPLASTY PLUS Y-ADAPTER

## (undated) DEVICE — CV PACK-LF: Brand: MEDLINE INDUSTRIES, INC.

## (undated) DEVICE — SUT SILK 2-0 SH K833H

## (undated) DEVICE — SUT SILK 3-0 A304H

## (undated) DEVICE — SHEATH MICROPUNCTURE STIFF

## (undated) DEVICE — FIXED CORE WIRE GUIDE SAFE-T-J, CURVED: Brand: COOK

## (undated) DEVICE — SUT PROLENE 5-0 C-1 8720H

## (undated) DEVICE — APPLICATOR CHLORAPREP 26ML

## (undated) DEVICE — STERILE POLYISOPRENE POWDER-FREE SURGICAL GLOVES: Brand: PROTEXIS

## (undated) DEVICE — FLOSEAL WITH RECOTHROM - 5ML: Brand: FLOSEAL HEMOSTATIC MATRIX

## (undated) DEVICE — STANDARD HYPODERMIC NEEDLE,POLYPROPYLENE HUB: Brand: MONOJECT

## (undated) DEVICE — INTRODUCER TCAR 21G/5F

## (undated) DEVICE — Device: Brand: INTELLICART™

## (undated) DEVICE — ANGLED-TIP ARTERIAL SHEATH CONFIGURATION: Brand: ENROUTE TRANSCAROTID NEUROPROTECTION SYSTEM

## (undated) DEVICE — 3M™ BAIR HUGGER® UNDERBODY BLANKET, FULL ACCESS, 10 PER CASE 63500: Brand: BAIR HUGGER™

## (undated) DEVICE — SLEEVE KENDALL SCD EXPRESS MED

## (undated) DEVICE — WIRE ENROUTE 5X15X95CM

## (undated) DEVICE — 3M™ IOBAN™ 2 ANTIMICROBIAL INCISE DRAPE 6650EZ: Brand: IOBAN™ 2